# Patient Record
Sex: FEMALE
[De-identification: names, ages, dates, MRNs, and addresses within clinical notes are randomized per-mention and may not be internally consistent; named-entity substitution may affect disease eponyms.]

---

## 2018-06-18 PROBLEM — Z00.00 ENCOUNTER FOR PREVENTIVE HEALTH EXAMINATION: Status: ACTIVE | Noted: 2018-06-18

## 2018-09-24 ENCOUNTER — APPOINTMENT (OUTPATIENT)
Dept: ENDOCRINOLOGY | Facility: CLINIC | Age: 43
End: 2018-09-24
Payer: COMMERCIAL

## 2018-09-24 VITALS
BODY MASS INDEX: 29.82 KG/M2 | HEART RATE: 96 BPM | WEIGHT: 179 LBS | DIASTOLIC BLOOD PRESSURE: 98 MMHG | SYSTOLIC BLOOD PRESSURE: 151 MMHG | HEIGHT: 65 IN

## 2018-09-24 DIAGNOSIS — Z80.1 FAMILY HISTORY OF MALIGNANT NEOPLASM OF TRACHEA, BRONCHUS AND LUNG: ICD-10-CM

## 2018-09-24 DIAGNOSIS — Z82.0 FAMILY HISTORY OF EPILEPSY AND OTHER DISEASES OF THE NERVOUS SYSTEM: ICD-10-CM

## 2018-09-24 DIAGNOSIS — F17.200 NICOTINE DEPENDENCE, UNSPECIFIED, UNCOMPLICATED: ICD-10-CM

## 2018-09-24 DIAGNOSIS — Z80.3 FAMILY HISTORY OF MALIGNANT NEOPLASM OF BREAST: ICD-10-CM

## 2018-09-24 DIAGNOSIS — Z80.0 FAMILY HISTORY OF MALIGNANT NEOPLASM OF DIGESTIVE ORGANS: ICD-10-CM

## 2018-09-24 DIAGNOSIS — Z82.49 FAMILY HISTORY OF ISCHEMIC HEART DISEASE AND OTHER DISEASES OF THE CIRCULATORY SYSTEM: ICD-10-CM

## 2018-09-24 DIAGNOSIS — Z83.3 FAMILY HISTORY OF DIABETES MELLITUS: ICD-10-CM

## 2018-09-24 DIAGNOSIS — Z78.9 OTHER SPECIFIED HEALTH STATUS: ICD-10-CM

## 2018-09-24 DIAGNOSIS — Z80.42 FAMILY HISTORY OF MALIGNANT NEOPLASM OF PROSTATE: ICD-10-CM

## 2018-09-24 PROCEDURE — 99204 OFFICE O/P NEW MOD 45 MIN: CPT

## 2018-09-24 RX ORDER — METFORMIN HYDROCHLORIDE 1000 MG/1
1000 TABLET, COATED ORAL
Refills: 0 | Status: ACTIVE | COMMUNITY

## 2018-09-24 RX ORDER — ASPIRIN 81 MG/1
81 TABLET, CHEWABLE ORAL
Refills: 0 | Status: ACTIVE | COMMUNITY

## 2018-09-24 RX ORDER — CARVEDILOL 6.25 MG/1
6.25 TABLET, FILM COATED ORAL
Refills: 0 | Status: ACTIVE | COMMUNITY

## 2018-09-24 RX ORDER — VERAPAMIL HYDROCHLORIDE 80 MG/1
80 TABLET ORAL
Refills: 0 | Status: ACTIVE | COMMUNITY

## 2018-10-12 LAB
ALBUMIN SERPL ELPH-MCNC: 4.4 G/DL
ALP BLD-CCNC: 63 U/L
ALT SERPL-CCNC: 15 U/L
ANION GAP SERPL CALC-SCNC: 14 MMOL/L
AST SERPL-CCNC: 12 U/L
BILIRUB SERPL-MCNC: <0.2 MG/DL
BUN SERPL-MCNC: 25 MG/DL
CALCIUM SERPL-MCNC: 9.7 MG/DL
CHLORIDE SERPL-SCNC: 100 MMOL/L
CHOLEST SERPL-MCNC: 222 MG/DL
CHOLEST/HDLC SERPL: 4.6 RATIO
CO2 SERPL-SCNC: 22 MMOL/L
CREAT SERPL-MCNC: 1.2 MG/DL
CREAT SPEC-SCNC: 85 MG/DL
GLUCOSE SERPL-MCNC: 294 MG/DL
HBA1C MFR BLD HPLC: 8.7 %
HDLC SERPL-MCNC: 48 MG/DL
LDLC SERPL CALC-MCNC: 107 MG/DL
MICROALBUMIN 24H UR DL<=1MG/L-MCNC: 4.7 MG/DL
MICROALBUMIN/CREAT 24H UR-RTO: 55 MG/G
POTASSIUM SERPL-SCNC: 5.3 MMOL/L
PROT SERPL-MCNC: 7.7 G/DL
SODIUM SERPL-SCNC: 135 MMOL/L
TRIGL SERPL-MCNC: 333 MG/DL
TSH SERPL-ACNC: 1.16 UIU/ML

## 2019-02-12 ENCOUNTER — APPOINTMENT (OUTPATIENT)
Dept: ENDOCRINOLOGY | Facility: CLINIC | Age: 44
End: 2019-02-12
Payer: COMMERCIAL

## 2019-02-12 VITALS — WEIGHT: 178 LBS | BODY MASS INDEX: 29.62 KG/M2

## 2019-02-12 VITALS
WEIGHT: 179 LBS | BODY MASS INDEX: 29.82 KG/M2 | DIASTOLIC BLOOD PRESSURE: 89 MMHG | HEART RATE: 79 BPM | HEIGHT: 65 IN | SYSTOLIC BLOOD PRESSURE: 141 MMHG

## 2019-02-12 DIAGNOSIS — E11.9 TYPE 2 DIABETES MELLITUS W/OUT COMPLICATIONS: ICD-10-CM

## 2019-02-12 PROCEDURE — 97802 MEDICAL NUTRITION INDIV IN: CPT

## 2019-02-12 PROCEDURE — 99214 OFFICE O/P EST MOD 30 MIN: CPT

## 2019-02-12 RX ORDER — BLOOD SUGAR DIAGNOSTIC
STRIP MISCELLANEOUS DAILY
Qty: 1 | Refills: 3 | Status: ACTIVE | COMMUNITY
Start: 2019-02-12 | End: 1900-01-01

## 2019-02-12 RX ORDER — LANCETS 33 GAUGE
EACH MISCELLANEOUS
Qty: 100 | Refills: 3 | Status: ACTIVE | COMMUNITY
Start: 2019-02-12 | End: 1900-01-01

## 2019-02-12 RX ORDER — 70%ISOPROPYL ALCOHOL 0.7 ML/ML
70 SWAB TOPICAL
Qty: 1 | Refills: 5 | Status: ACTIVE | COMMUNITY
Start: 2019-02-12 | End: 1900-01-01

## 2019-02-13 NOTE — ASSESSMENT
[FreeTextEntry1] : Diabetes, suboptimal. goal A1c < 7.0%.  will get labs from Dr Vergara.\par Explained that I want her A1c < 6.5% when planning pregnancy and then during pregnancy, A1c goal will be < 6.0%.  glucose goals reviewed:  am goal < 130, and 2h post meal < 170.  for pregnancy, goals are stricter: am goal < 95, and 2h goal < 130. \par I wanted to change her DPP4 inhibitor to GLP1 agonist to get more weight loss effect, because weight loss will improve her fertility, and at her age, all factors needs to be optimized.  she did not want to change her meds at this time.  Explained to patient that for pregnancy, she can continue metformin but diabetes will need to be managed mostly with insulin.\par RTO 3 months

## 2019-02-13 NOTE — HISTORY OF PRESENT ILLNESS
[FreeTextEntry1] : not testing glucose.  says she forgot how to use the meter and then also ran out of lancets. has the Verio meter.\par Dr Vergara checked A1c last month but she does not remember the result\par stressed for past two months: helped parents sell their house, in process of buying house, and office moved this week\par periods regular but had extra spotting last month\par had gout flare up in December (near instep of R foot)\par saw podiatry\par up to date with ophtho\par no neuropathy symptoms\par \par diagnosed with PCOS and was told it would be difficult for her to get pregnant because she also has bicornuate uterus.  She had miscarriage one year.  She would like to consider pregnancy for 1 more year.  periods are regular, every month (used to be irregular).\par will get flu vaccine at work.\par \par Meds:\par metformin 1g bid\par Januvia 100mg\par carvedilol 6.25mg bid\par verapamil 80mg tid\par aspirin 81mg

## 2019-02-13 NOTE — PHYSICAL EXAM
[Alert] : alert [Healthy Appearance] : healthy appearance [Normal Voice/Communication] : normal voice communication [No Proptosis] : no proptosis [No Lid Lag] : no lid lag [Normal Hearing] : hearing was normal [Thyroid Not Enlarged] : the thyroid was not enlarged [No Thyroid Nodules] : there were no palpable thyroid nodules [No Edema] : there was no peripheral edema [Normal Affect] : the affect was normal [Normal Mood] : the mood was normal [de-identified] : fingerstick 113, post lunch (chicken, bell peppers)

## 2019-02-13 NOTE — DATA REVIEWED
[FreeTextEntry1] : 10/18: A1c 8.7%,  urine microalbumin/cr 55, tot chol 222, trig 333, HDL 48, , TSH 1.16

## 2019-04-09 ENCOUNTER — APPOINTMENT (OUTPATIENT)
Dept: ENDOCRINOLOGY | Facility: CLINIC | Age: 44
End: 2019-04-09

## 2019-07-18 ENCOUNTER — APPOINTMENT (OUTPATIENT)
Dept: ENDOCRINOLOGY | Facility: CLINIC | Age: 44
End: 2019-07-18
Payer: COMMERCIAL

## 2019-07-18 VITALS
WEIGHT: 188 LBS | HEART RATE: 82 BPM | BODY MASS INDEX: 31.29 KG/M2 | DIASTOLIC BLOOD PRESSURE: 96 MMHG | SYSTOLIC BLOOD PRESSURE: 189 MMHG

## 2019-07-18 PROCEDURE — 99214 OFFICE O/P EST MOD 30 MIN: CPT

## 2019-07-18 RX ORDER — SITAGLIPTIN 100 MG/1
100 TABLET, FILM COATED ORAL
Refills: 0 | Status: DISCONTINUED | COMMUNITY
End: 2019-07-18

## 2019-07-18 NOTE — DATA REVIEWED
[FreeTextEntry1] : 6/19: A1c 7.8%, tot chol 219, trig 318, HDL 47, , urine albumin < 0.50\par 10/18: A1c 8.7%,  urine microalbumin/cr 55, tot chol 222, trig 333, HDL 48, , TSH 1.16

## 2019-07-18 NOTE — PHYSICAL EXAM
[Alert] : alert [Healthy Appearance] : healthy appearance [Normal Voice/Communication] : normal voice communication [No Proptosis] : no proptosis [No Lid Lag] : no lid lag [Normal Hearing] : hearing was normal [Thyroid Not Enlarged] : the thyroid was not enlarged [No Thyroid Nodules] : there were no palpable thyroid nodules [Clear to Auscultation] : lungs were clear to auscultation bilaterally [Normal S1, S2] : normal S1 and S2 [Regular Rhythm] : with a regular rhythm [Pedal Pulses Normal] : the pedal pulses are present [No Edema] : there was no peripheral edema [Normal Sensation on Monofilament Testing] : normal sensation on monofilament testing of lower extremities [Normal Affect] : the affect was normal [Normal Mood] : the mood was normal [Foot Ulcers] : no foot ulcers [de-identified] : fingerstick 150, 1.5h pp cereal [de-identified] : no onychomycoses,

## 2019-07-18 NOTE — HISTORY OF PRESENT ILLNESS
[FreeTextEntry1] : has not been in a good "headspace".  nephew and father passed away this year, she went to [place] in a hurry and didn't have her meds.  says she sort of "checked out"\par saw ophtho this month, no DR\par no polyuria, polydipsia, SOB, chest pain, or neuropathy symptoms \par not testing sugars. not exercising.\par \par Meds:\par metformin 1g bid\par Januvia 100mg\par carvedilol 6.25mg bid\par verapamil 80mg tid\par aspirin 81mg

## 2019-07-18 NOTE — ASSESSMENT
[FreeTextEntry1] : Diabetes, suboptimal. goal A1c < 7.0%. \par recommend changing Januvia to either GL1 agonist or SGLT 2 inhibitor to better A1c lowering and also to help aid weight loss. after discussing the option, she is agreeable to trying SGL2 inhibitor. Potential side effects  discussed: increased urination, increased risk of urinary/genital infection. Advised to drink enough water (avoid dehydration). . Start Farxiga 5mg/day, take in the morning; coupon given\par continue metformin.\par RTO 3 months

## 2019-07-31 ENCOUNTER — APPOINTMENT (OUTPATIENT)
Dept: ENDOCRINOLOGY | Facility: CLINIC | Age: 44
End: 2019-07-31

## 2019-12-31 ENCOUNTER — APPOINTMENT (OUTPATIENT)
Dept: ENDOCRINOLOGY | Facility: CLINIC | Age: 44
End: 2019-12-31

## 2020-01-29 ENCOUNTER — RX RENEWAL (OUTPATIENT)
Age: 45
End: 2020-01-29

## 2020-01-29 RX ORDER — DAPAGLIFLOZIN 5 MG/1
5 TABLET, FILM COATED ORAL
Qty: 30 | Refills: 2 | Status: ACTIVE | COMMUNITY
Start: 2019-07-18 | End: 1900-01-01